# Patient Record
Sex: FEMALE | Race: WHITE
[De-identification: names, ages, dates, MRNs, and addresses within clinical notes are randomized per-mention and may not be internally consistent; named-entity substitution may affect disease eponyms.]

---

## 2018-05-24 ENCOUNTER — HOSPITAL ENCOUNTER (OUTPATIENT)
Dept: HOSPITAL 57 - BURRAD | Age: 43
Discharge: HOME | End: 2018-05-24
Attending: PHYSICIAN ASSISTANT
Payer: COMMERCIAL

## 2018-05-24 DIAGNOSIS — Z87.01: ICD-10-CM

## 2018-05-24 DIAGNOSIS — J40: Primary | ICD-10-CM

## 2018-05-24 PROCEDURE — 71046 X-RAY EXAM CHEST 2 VIEWS: CPT

## 2018-05-24 NOTE — RAD
CHEST TWO VIEWS:

 

05/24/2018

 

COMPARISON:

08/13/2015

 

FINDINGS:

The heart is normal in size.  Scoliosis is noted, as usual.  There is no vascular congestion, edema, 
or pleural effusion.

 

While there is no lobar consolidation, the basilar lung markings seem a little more prominent today t
campos before.  This is also true in the retrocardiac region on the lateral view.  This is a nonspecific
 finding and can be seen in many entities, including bronchitis.

 

IMPRESSION:

Mild prominence of basilar markings compared to the prior study.

 

POS: HOME